# Patient Record
Sex: MALE | Race: WHITE | NOT HISPANIC OR LATINO | Employment: FULL TIME | URBAN - METROPOLITAN AREA
[De-identification: names, ages, dates, MRNs, and addresses within clinical notes are randomized per-mention and may not be internally consistent; named-entity substitution may affect disease eponyms.]

---

## 2023-10-18 PROBLEM — R53.82 CHRONIC FATIGUE: Status: ACTIVE | Noted: 2023-10-18

## 2023-10-18 PROBLEM — K60.2 ANAL FISSURE: Status: ACTIVE | Noted: 2023-10-18

## 2023-10-18 PROBLEM — K21.9 GERD (GASTROESOPHAGEAL REFLUX DISEASE): Status: ACTIVE | Noted: 2023-10-18

## 2023-10-18 PROBLEM — H92.02 OTALGIA, LEFT: Status: ACTIVE | Noted: 2023-10-18

## 2023-10-18 PROBLEM — E66.3 OVERWEIGHT WITH BODY MASS INDEX (BMI) OF 27 TO 27.9 IN ADULT: Status: ACTIVE | Noted: 2023-10-18

## 2023-10-18 PROBLEM — K62.89 ANAL IRRITATION: Status: ACTIVE | Noted: 2023-10-18

## 2023-10-18 PROBLEM — H61.22 EXCESSIVE CERUMEN IN LEFT EAR CANAL: Status: ACTIVE | Noted: 2023-10-18

## 2023-10-18 PROBLEM — F32.A ANXIETY AND DEPRESSION: Status: ACTIVE | Noted: 2023-10-18

## 2023-10-18 PROBLEM — F41.9 ANXIETY AND DEPRESSION: Status: ACTIVE | Noted: 2023-10-18

## 2023-10-18 PROBLEM — H93.8X2 SENSATION OF FULLNESS IN LEFT EAR: Status: ACTIVE | Noted: 2023-10-18

## 2023-10-18 PROBLEM — F90.9 ADHD: Status: ACTIVE | Noted: 2023-10-18

## 2023-10-18 RX ORDER — FAMOTIDINE 20 MG/1
1 TABLET, FILM COATED ORAL NIGHTLY
COMMUNITY
Start: 2022-06-28

## 2023-10-18 RX ORDER — BUPROPION HYDROCHLORIDE 150 MG/1
150 TABLET ORAL DAILY
COMMUNITY
End: 2023-11-20

## 2023-10-18 RX ORDER — DEXTROAMPHETAMINE SACCHARATE, AMPHETAMINE ASPARTATE MONOHYDRATE, DEXTROAMPHETAMINE SULFATE AND AMPHETAMINE SULFATE 5; 5; 5; 5 MG/1; MG/1; MG/1; MG/1
20 CAPSULE, EXTENDED RELEASE ORAL DAILY
COMMUNITY

## 2023-10-18 RX ORDER — SERTRALINE HYDROCHLORIDE 100 MG/1
1 TABLET, FILM COATED ORAL DAILY
COMMUNITY
Start: 2022-04-15

## 2023-10-19 ENCOUNTER — APPOINTMENT (OUTPATIENT)
Dept: RADIOLOGY | Facility: HOSPITAL | Age: 23
End: 2023-10-19
Payer: COMMERCIAL

## 2023-10-19 ENCOUNTER — OFFICE VISIT (OUTPATIENT)
Dept: OTOLARYNGOLOGY | Facility: CLINIC | Age: 23
End: 2023-10-19
Payer: COMMERCIAL

## 2023-10-19 ENCOUNTER — CLINICAL SUPPORT (OUTPATIENT)
Dept: AUDIOLOGY | Facility: CLINIC | Age: 23
End: 2023-10-19
Payer: COMMERCIAL

## 2023-10-19 ENCOUNTER — HOSPITAL ENCOUNTER (EMERGENCY)
Facility: HOSPITAL | Age: 23
Discharge: HOME | End: 2023-10-19
Payer: COMMERCIAL

## 2023-10-19 VITALS
BODY MASS INDEX: 27.07 KG/M2 | WEIGHT: 178.6 LBS | RESPIRATION RATE: 16 BRPM | TEMPERATURE: 97.6 F | DIASTOLIC BLOOD PRESSURE: 87 MMHG | SYSTOLIC BLOOD PRESSURE: 134 MMHG | HEIGHT: 68 IN | HEART RATE: 63 BPM

## 2023-10-19 VITALS
HEART RATE: 73 BPM | SYSTOLIC BLOOD PRESSURE: 132 MMHG | DIASTOLIC BLOOD PRESSURE: 88 MMHG | OXYGEN SATURATION: 97 % | RESPIRATION RATE: 16 BRPM | TEMPERATURE: 98.1 F

## 2023-10-19 DIAGNOSIS — H93.12 TINNITUS AURIUM, LEFT: Primary | ICD-10-CM

## 2023-10-19 DIAGNOSIS — H93.8X3 PRESSURE SENSATION IN BOTH EARS: ICD-10-CM

## 2023-10-19 DIAGNOSIS — K21.9 GASTROESOPHAGEAL REFLUX DISEASE WITHOUT ESOPHAGITIS: ICD-10-CM

## 2023-10-19 DIAGNOSIS — H93.13 TINNITUS OF BOTH EARS: Primary | ICD-10-CM

## 2023-10-19 DIAGNOSIS — R07.9 CHEST PAIN, UNSPECIFIED TYPE: Primary | ICD-10-CM

## 2023-10-19 PROCEDURE — 99283 EMERGENCY DEPT VISIT LOW MDM: CPT

## 2023-10-19 PROCEDURE — 99203 OFFICE O/P NEW LOW 30 MIN: CPT | Performed by: NURSE PRACTITIONER

## 2023-10-19 PROCEDURE — 92557 COMPREHENSIVE HEARING TEST: CPT | Performed by: AUDIOLOGIST

## 2023-10-19 PROCEDURE — 1036F TOBACCO NON-USER: CPT | Performed by: NURSE PRACTITIONER

## 2023-10-19 PROCEDURE — 71046 X-RAY EXAM CHEST 2 VIEWS: CPT | Mod: FOREIGN READ | Performed by: RADIOLOGY

## 2023-10-19 PROCEDURE — 99213 OFFICE O/P EST LOW 20 MIN: CPT | Performed by: NURSE PRACTITIONER

## 2023-10-19 PROCEDURE — 92550 TYMPANOMETRY & REFLEX THRESH: CPT | Performed by: AUDIOLOGIST

## 2023-10-19 PROCEDURE — 2500000001 HC RX 250 WO HCPCS SELF ADMINISTERED DRUGS (ALT 637 FOR MEDICARE OP): Performed by: PHYSICIAN ASSISTANT

## 2023-10-19 PROCEDURE — 71046 X-RAY EXAM CHEST 2 VIEWS: CPT | Mod: FY

## 2023-10-19 RX ORDER — ALUMINUM HYDROXIDE, MAGNESIUM HYDROXIDE, AND SIMETHICONE 1200; 120; 1200 MG/30ML; MG/30ML; MG/30ML
30 SUSPENSION ORAL ONCE
Status: COMPLETED | OUTPATIENT
Start: 2023-10-19 | End: 2023-10-19

## 2023-10-19 RX ORDER — OMEPRAZOLE 20 MG/1
40 CAPSULE, DELAYED RELEASE ORAL DAILY
Qty: 30 CAPSULE | Refills: 0 | Status: SHIPPED | OUTPATIENT
Start: 2023-10-19 | End: 2023-11-03

## 2023-10-19 RX ORDER — LIDOCAINE HYDROCHLORIDE 20 MG/ML
15 SOLUTION OROPHARYNGEAL ONCE
Status: COMPLETED | OUTPATIENT
Start: 2023-10-19 | End: 2023-10-19

## 2023-10-19 RX ADMIN — ALUMINUM HYDROXIDE, MAGNESIUM HYDROXIDE, AND DIMETHICONE 30 ML: 200; 20; 200 SUSPENSION ORAL at 14:28

## 2023-10-19 RX ADMIN — LIDOCAINE HYDROCHLORIDE 15 ML: 20 SOLUTION ORAL at 14:28

## 2023-10-19 SDOH — ECONOMIC STABILITY: FOOD INSECURITY: WITHIN THE PAST 12 MONTHS, THE FOOD YOU BOUGHT JUST DIDN'T LAST AND YOU DIDN'T HAVE MONEY TO GET MORE.: NEVER TRUE

## 2023-10-19 SDOH — ECONOMIC STABILITY: FOOD INSECURITY: WITHIN THE PAST 12 MONTHS, YOU WORRIED THAT YOUR FOOD WOULD RUN OUT BEFORE YOU GOT MONEY TO BUY MORE.: NEVER TRUE

## 2023-10-19 ASSESSMENT — ENCOUNTER SYMPTOMS
DEPRESSION: 0
LOSS OF SENSATION IN FEET: 0
OCCASIONAL FEELINGS OF UNSTEADINESS: 0

## 2023-10-19 ASSESSMENT — COLUMBIA-SUICIDE SEVERITY RATING SCALE - C-SSRS

## 2023-10-19 ASSESSMENT — PAIN - FUNCTIONAL ASSESSMENT: PAIN_FUNCTIONAL_ASSESSMENT: 0-10

## 2023-10-19 ASSESSMENT — LIFESTYLE VARIABLES
EVER HAD A DRINK FIRST THING IN THE MORNING TO STEADY YOUR NERVES TO GET RID OF A HANGOVER: NO
HAVE YOU EVER FELT YOU SHOULD CUT DOWN ON YOUR DRINKING: NO
HAVE PEOPLE ANNOYED YOU BY CRITICIZING YOUR DRINKING: NO
EVER FELT BAD OR GUILTY ABOUT YOUR DRINKING: NO

## 2023-10-19 ASSESSMENT — PAIN SCALES - GENERAL
PAINLEVEL: 0-NO PAIN
PAINLEVEL_OUTOF10: 0 - NO PAIN

## 2023-10-19 ASSESSMENT — PATIENT HEALTH QUESTIONNAIRE - PHQ9
SUM OF ALL RESPONSES TO PHQ9 QUESTIONS 1 AND 2: 0
2. FEELING DOWN, DEPRESSED OR HOPELESS: NOT AT ALL
1. LITTLE INTEREST OR PLEASURE IN DOING THINGS: NOT AT ALL

## 2023-10-19 NOTE — PROGRESS NOTES
Subjective   Patient ID: Terrell Carlos is a 23 y.o. male who presents for Tinnitus (NPV- Ringing in the ears.).    HPI    Has had bilateral tinnitus for years bilaterally, L >R.. A couple weeks ago having pressure and discomfort. The left ear is worse. Today the right ear has some discomfort. No hearing loss. Can have trouble focusing with a lot of background noise. No dizziness. No previous ear surgery.  Really just wants to get the ears checked.    Past Medical History:   Diagnosis Date    ADHD      Past Surgical History:   Procedure Laterality Date    OTHER SURGICAL HISTORY  04/15/2022    Oral surgery    TONSILLECTOMY  09/08/2016    Tonsillectomy     Review of Systems    All other systems have been reviewed and are negative for complaints except for those mentioned in history of present illness, past medical history and problem list.    Objective   Physical Exam    Constitutional: No fever, chills, weight loss or weight gain  General appearance: Appears well, well-nourished, well groomed. No acute distress.    Communication: Normal communication    Psychiatric: Oriented to person, place and time. Normal mood and affect.    Neurologic: Cranial nerves II-XII grossly intact and symmetric bilaterally.    Head and Face:  Head: Atraumatic with no masses, lesions or scarring.  Face: Normal symmetry. No scars or deformities.  TMJ: Normal, no trismus.    Eyes: Conjunctiva not edematous or erythematous. PERRLA    Right Ear: External inspection of ear with no deformity, scars, or masses. EAC is clear.  TM is intact with no sign of infection, effusion, or retraction.  No perforation seen.     Left Ear: External inspection of ear with no deformity, scars, or masses. EAC is clear.  TM is intact with no sign of infection, effusion, or retraction.  No perforation seen.     Nose: External inspection of nose: No nasal lesions, lacerations or scars. Anterior rhinoscopy with limited visualization past the inferior turbinates.  No tenderness on frontal or maxillary sinus palpation.    Oral Cavity/Mouth: Oral cavity and oropharynx mucosa moist and pink. No lesions or masses. Dentition normal. Tonsils appear surgically removed. Uvula is midline. Tongue with no masses or lesions. Tongue with good mobility. The oropharynx is clear.    Neck: Normal appearing, symmetric, trachea midline.     Cardiovascular: Examination of peripheral vascular system shows no clubbing or cyanosis.    Respiratory: No respiratory distress increased work of breathing. Inspection of the chest with symmetric chest expansion and normal respiratory effort.    Skin: No head and neck rashes.    Lymph nodes: No adenopathy.     Diagnostic Results   I personally interpreted audiogram from today which shows normal hearing bilaterally. Type A tympanograms. Excellent WRS of 100% at 50 dB bilaterally.     Assessment/Plan   Diagnoses and all orders for this visit:  Tinnitus of both ears  Pressure sensation in both ears     Reassurance given there is no infection and hearing is normal.  He may follow up as needed.  All questions answered to patient satisfaction.

## 2023-10-19 NOTE — ED TRIAGE NOTES
Patient arrives from home with complaints of chest pain ongoing since Monday.  Patient has a history of GERD but feels that this is different. Describes it as mild light pressure to chest.  Did have and EKG done in emergent care center that showed NSR this a.m. but they recommended he come here for further evaluation.

## 2023-10-19 NOTE — ED PROVIDER NOTES
HPI   Chief Complaint   Patient presents with    Chest Pain     Patient arrives from home with complaints of chest pain ongoing since Monday.  Patient has a history of GERD but feels that this is different. Describes it as mild light pressure to chest.  Did have and EKG done in emergent care center that showed NSR this a.m. but they recommended he come here for further evaluation.       This is a 23-year-old male with a history of ADHD, anxiety and GERD who presents with chest pain.  He reports since earlier today he has had a mild tightness in the left side of his chest.  He has had similar discomfort in the past with his GERD however today it felt slightly different.  The pain is not pleuritic.  There are no exacerbating relieving factors.  He denies having radiation of pain, palpitations, lightheadedness or shortness of breath.  He denies recent cough, congestion or fevers.  He does not have any PE risk factors including recent travel, surgery, immobilization, injury or personal/family history.  He also does not have any significant family cardiac history of CAD.  [facility] and they referred him for further evaluation.                          No data recorded                Patient History   Past Medical History:   Diagnosis Date    ADHD     Anxiety     GERD (gastroesophageal reflux disease)      Past Surgical History:   Procedure Laterality Date    OTHER SURGICAL HISTORY  04/15/2022    Oral surgery    TONSILLECTOMY  09/08/2016    Tonsillectomy     Family History   Problem Relation Name Age of Onset    Sudden death Mother      Other (enlarged heart) Mother      Hypertension Father      Breast cancer Maternal Grandmother      Skin cancer Maternal Grandfather      Heart attack Maternal Grandfather      Breast cancer Paternal Grandmother       Social History     Tobacco Use    Smoking status: Never    Smokeless tobacco: Never   Substance Use Topics    Alcohol use: Yes      Comment: socially    Drug use: Yes     Frequency: 1.0 times per week     Types: Marijuana       Physical Exam   ED Triage Vitals [10/19/23 1350]   Temp Heart Rate Resp BP   36.7 °C (98.1 °F) 93 18 145/79      SpO2 Temp Source Heart Rate Source Patient Position   100 % Temporal Monitor Sitting      BP Location FiO2 (%)     Right arm --       Physical Exam      Physical Exam:    Appearance: Alert and oriented.  Well-nourished well-developed male sitting in bed in no acute distress.    Head: Normocephalic,  atraumatic.    Eyes: PERRLA, EOMI.    ENT: Moist mucous membranes.    Cardiac: Regular rate and rhythm.  No murmur.    Chest: Chest wall nontender.    Pulmonary: Lungs clear bilaterally with good aeration.  No audible wheezing, rales or rhonchi.    Abdomen: Soft, nondistended, nontender with normoactive bowel sounds throughout.    Musculoskeletal: No peripheral edema or calf tenderness.  Neurovascular intact throughout.    Neurological: No focal or lateralizing deficit.    Psychiatric: Appropriate mood and affect.       ED Course & MDM   Diagnoses as of 10/19/23 1541   Chest pain, unspecified type   Gastroesophageal reflux disease without esophagitis       Medical Decision Making  This is a 23-year-old male with a history of ADHD, anxiety and GERD who presents with chest pain.  He was initially evaluated at [facility] urgent care and was referred here for further evaluation.  He reports since earlier today he has had a mild left-sided chest tightness.  He has had similar discomfort in the past with his group however it felt slightly different today.  He denies having any other cardiac symptoms.  He denies any URI symptoms as well.  He does not have any family history of CAD or PE risk factors.  He presents afebrile and hemodynamically stable.  I reviewed his EKG done at [facility] urgent care which revealed sinus arrhythmia without acute ST changes.  LA interval was 128 and QTc 406.  T waves slightly peaked in V4 and V5.   Given his history of.  He was given a GI cocktail.  A two-view chest x-ray was obtained and was negative for infiltrate, effusion, cardiac enlargement or other intrathoracic pathology.  Reevaluation he reports marked improvement in his chest discomfort.  I suspect this is likely GI related.  I did offer to check a CMP to rule out hyperkalemia given his peaked T waves however he is declining since he did not improve with the medication.  He will be placed on omeprazole.  He was instructed to follow-up with his physician.  He will return or go the nearest ED if he develops new or concerning symptoms.  He verbalized understanding and is amenable with the plan.        Procedure  Procedures     Maryse Lopez PA-C  10/19/23 7609

## 2023-10-19 NOTE — LETTER
October 19, 2023     LEO Reyes  [address]  [place] [address]    Patient: Terrell Carlos   YOB: 2000   Date of Visit: 10/19/2023       Dear LEO Brar:    Thank you for referring Terrell Carlos to me for evaluation. Below are my notes for this consultation.  If you have questions, please do not hesitate to call me. I look forward to following your patient along with you.       Sincerely,     BECCA Land CCC-A      CC: No Recipients  ______________________________________________________________________________________    Name: Terrell Carlos  YOB: 2000  Age: 23 y.o.    Date of Evaluation:  10/19/2023      History:  Reason for visit:  Terrell Carlos is seen today at the request of Emy Torres CNP for an evaluation of hearing.  Patient complains of Tinnitus (left). He also reports some ear fatigue by the end of the day. Terrell Carlos denies hearing loss, dizziness, aural fullness, ear infections, ear surgery, loud noise exposure, and family history of hearing loss.        Evaluation:    Otoscopy revealed clear ear canal and tympanic membrane visualized bilaterally  Immittance testing indicated normal middle ear pressure, mobility, and ear canal volume bilaterally.  Ipsilateral acoustic reflexes were present at 500-4000 Hz bilaterally.  Otoacoustic emissions were present at 2773-0673 Hz in the right ear and at 2939-4223 Hz in the left ear.    Behavioral hearing testing indicated normal hearing bilaterally at 125-8000 Hz.  Word recognition testing was completed using recorded speech at the patient's most comfortable level as documented on the audiogram.  Scores were excellent bilaterally.    Impression:    Testing indicated    Care Plan:    Follow-up with Emy Torres CNP  Retest hearing as needed.    BECAC Land CCC-A  Audiologist    Time: 3726-5505

## 2023-10-19 NOTE — PROGRESS NOTES
Name: Terrell Carlos  YOB: 2000  Age: 23 y.o.    Date of Evaluation:  10/19/2023      History:  Reason for visit:  Terrell Carlos is seen today at the request of Emy Torres CNP for an evaluation of hearing.  Patient complains of Tinnitus (left). He also reports some ear fatigue by the end of the day. Terrell Carlos denies hearing loss, dizziness, aural fullness, ear infections, ear surgery, loud noise exposure, and family history of hearing loss.        Evaluation:    Otoscopy revealed clear ear canal and tympanic membrane visualized bilaterally  Immittance testing indicated normal middle ear pressure, mobility, and ear canal volume bilaterally.  Ipsilateral acoustic reflexes were present at 500-4000 Hz bilaterally.  Otoacoustic emissions were present at 3283-9245 Hz in the right ear and at 7889-0717 Hz in the left ear.    Behavioral hearing testing indicated normal hearing bilaterally at 125-8000 Hz.  Word recognition testing was completed using recorded speech at the patient's most comfortable level as documented on the audiogram.  Scores were excellent bilaterally.      Impression:    Testing indicated    Care Plan:    Follow-up with Emy Torres CNP  Retest hearing as needed.    BECCA Land, CCC-A  Audiologist    Time: [id number]

## 2023-11-10 ENCOUNTER — OFFICE VISIT (OUTPATIENT)
Dept: PRIMARY CARE | Facility: CLINIC | Age: 23
End: 2023-11-10
Payer: COMMERCIAL

## 2023-11-10 VITALS
OXYGEN SATURATION: 97 % | DIASTOLIC BLOOD PRESSURE: 78 MMHG | HEIGHT: 68 IN | BODY MASS INDEX: 26.98 KG/M2 | HEART RATE: 71 BPM | WEIGHT: 178 LBS | SYSTOLIC BLOOD PRESSURE: 118 MMHG

## 2023-11-10 DIAGNOSIS — K21.9 GASTROESOPHAGEAL REFLUX DISEASE WITHOUT ESOPHAGITIS: Primary | ICD-10-CM

## 2023-11-10 PROCEDURE — 4004F PT TOBACCO SCREEN RCVD TLK: CPT | Performed by: STUDENT IN AN ORGANIZED HEALTH CARE EDUCATION/TRAINING PROGRAM

## 2023-11-10 PROCEDURE — 99213 OFFICE O/P EST LOW 20 MIN: CPT | Performed by: STUDENT IN AN ORGANIZED HEALTH CARE EDUCATION/TRAINING PROGRAM

## 2023-11-10 ASSESSMENT — ENCOUNTER SYMPTOMS
SORE THROAT: 0
FACIAL SWELLING: 0
ARTHRALGIAS: 0
FATIGUE: 0
DYSURIA: 0
NAUSEA: 0
FREQUENCY: 0
DIZZINESS: 0
CONSTIPATION: 0
VOMITING: 0
ABDOMINAL PAIN: 0
SHORTNESS OF BREATH: 0
COUGH: 0
JOINT SWELLING: 0
DIARRHEA: 0
CHILLS: 0
RHINORRHEA: 0
WHEEZING: 0
FEVER: 0
PALPITATIONS: 0

## 2023-11-10 NOTE — PROGRESS NOTES
"Subjective   Patient ID: Terrell Carlos is a 23 y.o. male who presents for [place] ER follow up.    HPI   Patient here for emergency room follow-up.  States that he had a lot of left lower rib pain.  Started after eating a large meal from [employer].  Very greasy.  Has a history of heartburn.  Did feel better after taking some heartburn medication from the emergency room.  Denies any chest pain today.  Denies any shortness of breath.  Does have a strong family history of heartburn.  Has not been under a lot of stress.  Unsure as to what to do at this point time.  Does not want that pain to return at this point.  Patient is feeling slightly better today.    Review of Systems   Constitutional:  Negative for chills, fatigue and fever.   HENT:  Negative for congestion, ear pain, facial swelling, hearing loss, rhinorrhea and sore throat.    Respiratory:  Negative for cough, shortness of breath and wheezing.    Cardiovascular:  Negative for chest pain and palpitations.   Gastrointestinal:  Negative for abdominal pain, constipation, diarrhea, nausea and vomiting.   Genitourinary:  Negative for dysuria and frequency.   Musculoskeletal:  Negative for arthralgias and joint swelling.   Skin:  Negative for rash.   Neurological:  Negative for dizziness and syncope.       Objective   /78   Pulse 71   Ht 1.734 m (5' 8.25\")   Wt 80.7 kg (178 lb)   SpO2 97%   BMI 26.87 kg/m²     Physical Exam  HENT:      Head: Normocephalic and atraumatic.      Right Ear: Tympanic membrane, ear canal and external ear normal.      Left Ear: Tympanic membrane, ear canal and external ear normal.      Nose: Nose normal.      Mouth/Throat:      Mouth: Mucous membranes are moist.      Pharynx: Oropharynx is clear. No posterior oropharyngeal erythema.   Eyes:      Conjunctiva/sclera: Conjunctivae normal.   Cardiovascular:      Rate and Rhythm: Normal rate and regular rhythm.      Pulses: Normal pulses.   Pulmonary:      Effort: Pulmonary " effort is normal.      Breath sounds: Normal breath sounds.   Abdominal:      General: Abdomen is flat.      Palpations: Abdomen is soft.   Skin:     General: Skin is warm and dry.   Neurological:      General: No focal deficit present.      Mental Status: He is alert.   Psychiatric:         Mood and Affect: Mood normal.         Behavior: Behavior normal.         Thought Content: Thought content normal.         Judgment: Judgment normal.         Assessment/Plan   Refer patient to gastroenterology for further evaluation.  Based on his history of most suspicious of gastritis.  We will review note once received

## 2023-11-20 DIAGNOSIS — F41.9 ANXIETY: Primary | ICD-10-CM

## 2023-11-20 RX ORDER — BUPROPION HYDROCHLORIDE 150 MG/1
150 TABLET ORAL DAILY
Qty: 30 TABLET | Refills: 1 | Status: SHIPPED | OUTPATIENT
Start: 2023-11-20